# Patient Record
Sex: FEMALE | Race: WHITE | NOT HISPANIC OR LATINO | URBAN - METROPOLITAN AREA
[De-identification: names, ages, dates, MRNs, and addresses within clinical notes are randomized per-mention and may not be internally consistent; named-entity substitution may affect disease eponyms.]

---

## 2018-02-20 ENCOUNTER — INPATIENT (INPATIENT)
Facility: HOSPITAL | Age: 23
LOS: 2 days | Discharge: HOME | End: 2018-02-23
Attending: PSYCHIATRY & NEUROLOGY

## 2018-02-20 VITALS
HEART RATE: 105 BPM | SYSTOLIC BLOOD PRESSURE: 144 MMHG | RESPIRATION RATE: 20 BRPM | DIASTOLIC BLOOD PRESSURE: 100 MMHG | TEMPERATURE: 98 F | OXYGEN SATURATION: 100 %

## 2018-02-20 DIAGNOSIS — F32.9 MAJOR DEPRESSIVE DISORDER, SINGLE EPISODE, UNSPECIFIED: ICD-10-CM

## 2018-02-20 DIAGNOSIS — F60.3 BORDERLINE PERSONALITY DISORDER: ICD-10-CM

## 2018-02-20 LAB
ALBUMIN SERPL ELPH-MCNC: 4.4 G/DL — SIGNIFICANT CHANGE UP (ref 3–5.5)
ALP SERPL-CCNC: 56 U/L — SIGNIFICANT CHANGE UP (ref 30–115)
ALT FLD-CCNC: 13 U/L — SIGNIFICANT CHANGE UP (ref 0–41)
ANION GAP SERPL CALC-SCNC: 7 MMOL/L — SIGNIFICANT CHANGE UP (ref 7–14)
APAP SERPL-MCNC: <10 UG/ML — SIGNIFICANT CHANGE UP (ref 10–30)
APPEARANCE UR: (no result)
AST SERPL-CCNC: 23 U/L — SIGNIFICANT CHANGE UP (ref 0–41)
BACTERIA # UR AUTO: (no result) /HPF
BASOPHILS # BLD AUTO: 0.07 K/UL — SIGNIFICANT CHANGE UP (ref 0–0.2)
BASOPHILS NFR BLD AUTO: 0.7 % — SIGNIFICANT CHANGE UP (ref 0–1)
BILIRUB SERPL-MCNC: 0.5 MG/DL — SIGNIFICANT CHANGE UP (ref 0.2–1.2)
BILIRUB UR-MCNC: NEGATIVE — SIGNIFICANT CHANGE UP
BUN SERPL-MCNC: 8 MG/DL — LOW (ref 10–20)
CALCIUM SERPL-MCNC: 9.7 MG/DL — SIGNIFICANT CHANGE UP (ref 8.5–10.1)
CHLORIDE SERPL-SCNC: 107 MMOL/L — SIGNIFICANT CHANGE UP (ref 98–110)
CO2 SERPL-SCNC: 24 MMOL/L — SIGNIFICANT CHANGE UP (ref 17–32)
COLOR SPEC: YELLOW — SIGNIFICANT CHANGE UP
CREAT SERPL-MCNC: 0.8 MG/DL — SIGNIFICANT CHANGE UP (ref 0.7–1.5)
DIFF PNL FLD: NEGATIVE — SIGNIFICANT CHANGE UP
EOSINOPHIL # BLD AUTO: 0.58 K/UL — SIGNIFICANT CHANGE UP (ref 0–0.7)
EOSINOPHIL NFR BLD AUTO: 5.6 % — SIGNIFICANT CHANGE UP (ref 0–8)
EPI CELLS # UR: (no result) /HPF
ETHANOL SERPL-MCNC: <5 MG/DL — SIGNIFICANT CHANGE UP
GLUCOSE SERPL-MCNC: 97 MG/DL — SIGNIFICANT CHANGE UP (ref 70–110)
GLUCOSE UR QL: NEGATIVE MG/DL — SIGNIFICANT CHANGE UP
HCT VFR BLD CALC: 40.5 % — SIGNIFICANT CHANGE UP (ref 37–47)
HGB BLD-MCNC: 13.8 G/DL — LOW (ref 14–18)
IMM GRANULOCYTES NFR BLD AUTO: 0.4 % — HIGH (ref 0.1–0.3)
KETONES UR-MCNC: NEGATIVE — SIGNIFICANT CHANGE UP
LEUKOCYTE ESTERASE UR-ACNC: (no result)
LYMPHOCYTES # BLD AUTO: 3.54 K/UL — HIGH (ref 1.2–3.4)
LYMPHOCYTES # BLD AUTO: 34.1 % — SIGNIFICANT CHANGE UP (ref 20.5–51.1)
MCHC RBC-ENTMCNC: 29.7 PG — SIGNIFICANT CHANGE UP (ref 27–31)
MCHC RBC-ENTMCNC: 34.1 G/DL — SIGNIFICANT CHANGE UP (ref 32–37)
MCV RBC AUTO: 87.3 FL — SIGNIFICANT CHANGE UP (ref 81–91)
MONOCYTES # BLD AUTO: 0.69 K/UL — HIGH (ref 0.1–0.6)
MONOCYTES NFR BLD AUTO: 6.6 % — SIGNIFICANT CHANGE UP (ref 1.7–9.3)
NEUTROPHILS # BLD AUTO: 5.46 K/UL — SIGNIFICANT CHANGE UP (ref 1.4–6.5)
NEUTROPHILS NFR BLD AUTO: 52.6 % — SIGNIFICANT CHANGE UP (ref 42.2–75.2)
NITRITE UR-MCNC: NEGATIVE — SIGNIFICANT CHANGE UP
NRBC # BLD: 0 /100 WBCS — SIGNIFICANT CHANGE UP (ref 0–0)
PH UR: 5.5 — SIGNIFICANT CHANGE UP (ref 5–8)
PLATELET # BLD AUTO: 307 K/UL — SIGNIFICANT CHANGE UP (ref 130–400)
POTASSIUM SERPL-MCNC: 4.5 MMOL/L — SIGNIFICANT CHANGE UP (ref 3.5–5)
POTASSIUM SERPL-SCNC: 4.5 MMOL/L — SIGNIFICANT CHANGE UP (ref 3.5–5)
PROT SERPL-MCNC: 7.3 G/DL — SIGNIFICANT CHANGE UP (ref 6–8)
PROT UR-MCNC: (no result) MG/DL
RBC # BLD: 4.64 M/UL — SIGNIFICANT CHANGE UP (ref 4.2–5.4)
RBC # FLD: 12.4 % — SIGNIFICANT CHANGE UP (ref 11.5–14.5)
SALICYLATES SERPL-MCNC: <4 MG/DL — SIGNIFICANT CHANGE UP (ref 4–30)
SODIUM SERPL-SCNC: 138 MMOL/L — SIGNIFICANT CHANGE UP (ref 135–146)
SP GR SPEC: 1.02 — SIGNIFICANT CHANGE UP (ref 1.01–1.03)
UROBILINOGEN FLD QL: 0.2 MG/DL — SIGNIFICANT CHANGE UP (ref 0.2–0.2)
WBC # BLD: 10.38 K/UL — SIGNIFICANT CHANGE UP (ref 4.8–10.8)
WBC # FLD AUTO: 10.38 K/UL — SIGNIFICANT CHANGE UP (ref 4.8–10.8)
WBC UR QL: SIGNIFICANT CHANGE UP /HPF

## 2018-02-20 RX ORDER — HYDROXYZINE HCL 10 MG
25 TABLET ORAL ONCE
Qty: 0 | Refills: 0 | Status: COMPLETED | OUTPATIENT
Start: 2018-02-21 | End: 2018-02-21

## 2018-02-20 RX ORDER — ACETAMINOPHEN 500 MG
650 TABLET ORAL EVERY 6 HOURS
Qty: 0 | Refills: 0 | Status: DISCONTINUED | OUTPATIENT
Start: 2018-02-21 | End: 2018-02-23

## 2018-02-20 RX ORDER — DROSPIRENONE AND ETHINYL ESTRADIOL 0.03MG-3MG
0 KIT ORAL
Qty: 0 | Refills: 0 | COMMUNITY

## 2018-02-20 NOTE — ED PROVIDER NOTE - PHYSICAL EXAMINATION
VITAL SIGNS: I have reviewed nursing notes and confirm.  CONSTITUTIONAL: Well-developed; well-nourished; in no acute distress.  SKIN: Skin exam is warm and dry, no acute rash.  HEAD: Normocephalic; atraumatic.  EYES: PERRL, EOM intact; conjunctiva and sclera clear.  CARD: Regular rate and rhythm.  RESP: No wheezes, rales or rhonchi.  ABD: Normal bowel sounds; soft; non-distended; non-tender  EXT: Normal ROM.  NEURO: Alert, oriented. Grossly unremarkable. No focal deficits.  PSYCH: Cooperative, appropriate, + SI, no HI, no apparent psychosis

## 2018-02-20 NOTE — ED BEHAVIORAL HEALTH ASSESSMENT NOTE - DESCRIPTION
none known From [place]. Senior at [employer], majoring in marketing and finance. Engaged to "[employer] for past 6 months Calm and cooperative, in behavioral control

## 2018-02-20 NOTE — ED BEHAVIORAL HEALTH ASSESSMENT NOTE - RISK ASSESSMENT
Pt's increasingly frequent suicidal gestures, inability to contract for safety, and poor coping skills corroborated by her fiance place her at high risk of harm to herself at this time. Pt will be admitted to inpatient psych admission for safety and observation.

## 2018-02-20 NOTE — ED ADULT NURSE NOTE - CHIEF COMPLAINT QUOTE
Patient recently diagnosed with borderline personality disorder, presents with suicidal thoughts. Patient had plan to jump off her fiances roof last week after having a disagreement with him

## 2018-02-20 NOTE — ED PROVIDER NOTE - NS ED ROS FT
Constitutional: no fever, chills  Cardiac: No chest pain, SOB or edema.  Respiratory: No cough or respiratory distress  GI: No nausea, vomiting, diarrhea or abdominal pain.  Neuro: No headache or weakness. No LOC.  Skin: No skin rash.  Psych: see HPI  Except as documented in the HPI, all other systems are negative.

## 2018-02-20 NOTE — ED BEHAVIORAL HEALTH ASSESSMENT NOTE - CURRENT MEDICATION
No prescribed meds. Pt's fiance started giving her his Effexor in December. Pt has been taking it daily but reports her mood "got worse"

## 2018-02-20 NOTE — ED ADULT NURSE NOTE - OBJECTIVE STATEMENT
Pt complains of suicidal thoughts, states she was on her fiances roof and was tempted to jump and hasn't been able to get up to go to school

## 2018-02-20 NOTE — ED PROVIDER NOTE - MEDICAL DECISION MAKING DETAILS
Patient with history of borderline personality disorder, here for assessment of increasingly frequent thoughts of self harm VS notable for , exam is unremarkable. Will check labs, consult psych, 1:1 obs in place.

## 2018-02-20 NOTE — ED BEHAVIORAL HEALTH ASSESSMENT NOTE - SUICIDE PROTECTIVE FACTORS
Future oriented/Supportive social network or family/Identifies reasons for living/Fear of death or dying due to pain/suffering

## 2018-02-20 NOTE — ED BEHAVIORAL HEALTH ASSESSMENT NOTE - PATIENT'S CHIEF COMPLAINT
"I recently found out I have Borderline Personality Disorder and I've been having suicidal thoughts"

## 2018-02-20 NOTE — ED PROVIDER NOTE - OBJECTIVE STATEMENT
23 yo F, hx of borderline personality disorder, on effexor and recently seeing a psychologist, here for assessment of increasingly frequent thoughts of suicide. Patient states she went on her fiance's roof last week and almost jumped off. Reports that since that time whenever she is upset she thinks of harming or killing herself. No recent drug or alcohol use.

## 2018-02-20 NOTE — ED BEHAVIORAL HEALTH ASSESSMENT NOTE - PRIMARY DX
Major depressive disorder with single episode, remission status unspecified Borderline personality disorder

## 2018-02-20 NOTE — ED BEHAVIORAL HEALTH ASSESSMENT NOTE - SUMMARY
23 yo  female with no prior IPP admissions, recent dx of Borderline Personality Disorder, presents with worsening mood and increasing suicidal ideation since December. Pt's worsening mood, increased sleep, low energy, poor concentration, with impairment in functioning at school may be symptoms of a depressive disorder. Pt may benefit from an antidepressant. Pt's increasingly frequent suicidal gestures, inability to contract for safety, and poor coping skills corroborated by her fiance place her at high risk of harm to herself at this time. Pt is requesting inpatient psych admission for safety and observation. Pt's history of labile interpersonal relationships, impulsivity, feeling of emptiness in relationships, recurrent suicidal gestures and affective instability are characteristic of Borderline Personality Disorder. Pt would benefit from DBT after stabilization.

## 2018-02-20 NOTE — ED BEHAVIORAL HEALTH ASSESSMENT NOTE - HPI (INCLUDE ILLNESS QUALITY, SEVERITY, DURATION, TIMING, CONTEXT, MODIFYING FACTORS, ASSOCIATED SIGNS AND SYMPTOMS)
21 yo  female, domiciled with meme, enrolled at [employer], with no prior IPP admissions, recent dx of Borderline Personality Disorder, presented to ED for worsening mood and increasing suicidal ideation. Pt reports worsening mood since December, with increased sleep (10-11 hours a day), low energy and poor concentration. Pt has not been attending class since December "I can't get out of bed" and has been having more frequent arguments with her fiance. Pt reports frequent suicidal thoughts over the past month culminating in an interrupted attempt last week. The patient had an argument with her fiance and became upset "I started thinking he was going to leave me" and went to the roof of their 14 floor building with the intent of jumping off the ledge. Pt's fiance followed her and verbally redirected her. Pt intentionally cut her hand on the roof but did not jump off. Pt started seeing a therapist last week after this incident and was diagnosed with Borderline Personality Disorder. However, pt continued to feel depressed, with suicidal thoughts, and this week was looking for knives to cut herself with and for her boyfriend's pills with a plan to overdose on them. Pt currently denies a suicide plan, but reports feeling depressed, helpless, unsure if she can contract for safety at home, and requesting voluntary admission for safety.    Collateral from pt's fiance: Per pt's fiance, pt has been increasingly depressed and labile since December, more argumentative, increasingly verbalizing suicidal ideation, missing school and work. Pt's fimadison is concerned about her safety at home.

## 2018-02-20 NOTE — ED BEHAVIORAL HEALTH ASSESSMENT NOTE - CASE SUMMARY
Pt is a 21yo single female with chronic hx of depression and self-harming behaviors, difficulty maintaining relationship, borderline traits.  pt reported worsening depressed mood and impaired functioning with increased intensity and frequency of SIB and suicidal thoughts and impulsive behaviors to end her life.  Pt has manifested impaired functioning.  She needs IPP to stablize and treat and reassess.

## 2018-02-20 NOTE — ED BEHAVIORAL HEALTH ASSESSMENT NOTE - DETAILS
father -  anxiety, depression; mother - ? borderline traits deferred see HPI bed available discussed with referent

## 2018-02-20 NOTE — ED BEHAVIORAL HEALTH ASSESSMENT NOTE - OTHER PAST PSYCHIATRIC HISTORY (INCLUDE DETAILS REGARDING ONSET, COURSE OF ILLNESS, INPATIENT/OUTPATIENT TREATMENT)
Pt saw a therapist a few years ago for interpersonal difficulties and SIB and was told she may have BPD. Pt started seeing a therapist Jeannette Bertrand last week who diagnosed her with BPD

## 2018-02-21 DIAGNOSIS — S06.0X0D CONCUSSION WITHOUT LOSS OF CONSCIOUSNESS, SUBSEQUENT ENCOUNTER: ICD-10-CM

## 2018-02-21 LAB
AMPHET UR-MCNC: NEGATIVE — SIGNIFICANT CHANGE UP
BARBITURATES UR SCN-MCNC: NEGATIVE — SIGNIFICANT CHANGE UP
BENZODIAZ UR-MCNC: NEGATIVE — SIGNIFICANT CHANGE UP
CHOLEST SERPL-MCNC: 188 MG/DL — SIGNIFICANT CHANGE UP (ref 100–200)
COCAINE METAB.OTHER UR-MCNC: NEGATIVE — SIGNIFICANT CHANGE UP
HCG SERPL-ACNC: <0.6 MIU/ML — SIGNIFICANT CHANGE UP (ref 0–5)
HDLC SERPL-MCNC: 70 MG/DL — HIGH (ref 40–60)
LIPID PNL WITH DIRECT LDL SERPL: 98 MG/DL — SIGNIFICANT CHANGE UP (ref 50–100)
METHADONE UR-MCNC: NEGATIVE — SIGNIFICANT CHANGE UP
OPIATES UR-MCNC: NEGATIVE — SIGNIFICANT CHANGE UP
PCP SPEC-MCNC: SIGNIFICANT CHANGE UP
PROPOXYPHENE QUALITATIVE URINE RESULT: NEGATIVE — SIGNIFICANT CHANGE UP
TOTAL CHOLESTEROL/HDL RATIO MEASUREMENT: 2.7 RATIO — LOW (ref 4–5.5)
TRIGL SERPL-MCNC: 129 MG/DL — SIGNIFICANT CHANGE UP (ref 40–150)

## 2018-02-21 RX ORDER — FLUOXETINE HCL 10 MG
20 CAPSULE ORAL DAILY
Qty: 0 | Refills: 0 | Status: DISCONTINUED | OUTPATIENT
Start: 2018-02-22 | End: 2018-02-23

## 2018-02-21 RX ORDER — FLUOXETINE HCL 10 MG
10 CAPSULE ORAL ONCE
Qty: 0 | Refills: 0 | Status: COMPLETED | OUTPATIENT
Start: 2018-02-21 | End: 2018-02-21

## 2018-02-21 RX ADMIN — Medication 10 MILLIGRAM(S): at 12:23

## 2018-02-21 RX ADMIN — Medication 25 MILLIGRAM(S): at 01:10

## 2018-02-21 NOTE — CONSULT NOTE ADULT - SUBJECTIVE AND OBJECTIVE BOX
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  Concussion last one 1-2 mo ago  ADD (attention deficit disorder)  Borderline personality disorder  No significant past surgical history      REVIEW OF SYSTEMS:  CONSTITUTIONAL: No fever, weight loss, or fatigue  EYES: No eye pain, visual disturbances, or discharge  ENMT:  No difficulty hearing, tinnitus, vertigo; No sinus or throat pain  NECK: No pain or stiffness  BREASTS: No pain, masses, or nipple discharge  RESPIRATORY: No cough, wheezing, chills or hemoptysis; No shortness of breath  CARDIOVASCULAR: No chest pain, palpitations, dizziness, or leg swelling  GASTROINTESTINAL: No abdominal or epigastric pain. No nausea, vomiting, or hematemesis; No diarrhea or constipation. No melena or hematochezia.  GENITOURINARY: No dysuria, frequency, hematuria, or incontinence  NEUROLOGICAL: No headaches, memory loss, loss of strength, numbness, or tremors  SKIN: No itching, burning, rashes, or lesions   LYMPH NODES: No enlarged glands  ALLERY AND IMMUNOLOGIC: No hives or eczema    Home Medications:  Effexor XR 75 mg oral capsule, extended release: 1 cap(s) orally once a day (20 Feb 2018 19:41)  Maribel: orally once a day (20 Feb 2018 19:41)  T(C): 36.8 (02-21-18 @ 11:52), Max: 36.8 (02-20-18 @ 18:39)  HR: 99 (02-21-18 @ 11:52) (96 - 112)  BP: 131/83 (02-21-18 @ 11:52) (123/77 - 144/100)  RR: 16 (02-21-18 @ 11:52) (16 - 20)  SpO2: 100% (02-20-18 @ 21:16) (100% - 100%)  Wt(kg): --Vital Signs Last 24 Hrs  T(C): 36.8 (21 Feb 2018 11:52), Max: 36.8 (20 Feb 2018 18:39)  T(F): 98.3 (21 Feb 2018 11:52), Max: 98.3 (21 Feb 2018 11:52)  HR: 99 (21 Feb 2018 11:52) (96 - 112)  BP: 131/83 (21 Feb 2018 11:52) (123/77 - 144/100)  BP(mean): --  RR: 16 (21 Feb 2018 11:52) (16 - 20)  SpO2: 100% (20 Feb 2018 21:16) (100% - 100%)    PHYSICAL EXAM:  GENERAL: NAD, well-groomed, well-developed  HEAD:  Atraumatic, Normocephalic  EYES: EOMI, PERRLA, conjunctiva and sclera clear  ENMT: No tonsillar erythema, exudates, or enlargement; Moist mucous membranes, Good dentition, No lesions  NECK: Supple, No JVD, Normal thyroid  NERVOUS SYSTEM:  Alert & Oriented X3, Good concentration; Motor Strength 5/5 B/L upper and lower extremities; DTRs 2+ intact and symmetric  CHEST/LUNG: Clear to percussion bilaterally; No rales, rhonchi, wheezing, or rubs  HEART: Regular rate and rhythm; No murmurs, rubs, or gallops  ABDOMEN: Soft, Nontender, Nondistended; Bowel sounds present  EXTREMITIES:  2+ Peripheral Pulses, No clubbing, cyanosis, or edema  LYMPH: No lymphadenopathy noted  SKIN: No rashes or lesions    Consultant(s) Notes Reviewed:  [x ] YES  [ ] NO  Care Discussed with Consultants/Other Providers [ x] YES  [ ] NO    LABS:  CBC   02-20-18 @ 19:36  Hematcorit 40.5  Hemoglobin 13.8  Mean Cell Hemoglobin 29.7  Platelet Count-Automated 307  RBC Count 4.64  Red Cell Distrib Width 12.4  Wbc Count 10.38      BMP  02-20-18 @ 19:36  Anion Gap. Serum 7  Blood Urea Nitrogen,Serm 8  Calcium, Total Serum 9.7  Carbon Dioxide, Serum 24  Chloride, Serum 107  Creatinine, Serum 0.8  eGFR in  117  eGFR in Non Afican American 101  Gloucose, serum 97  Potassium, Serum 4.5  Sodium, Serum 138                  CMP  02-20-18 @ 19:36  Ashli Aminotransferase(ALT/SGPT)13  Albumin, Serum 4.4  Alkaline Phosphatase, Serum 56  Anion Gap, Serum 7  Aspartate Aminotransferase (AST/SGOT)23  Bilirubin Total, Serum 0.5  Blood Urea Nitrogen, Serum 8  Calcium,Total Serum 9.7  Carbon Dioxide, Serum 24  Chloride, Serum 107  Creatinine, Serum 0.8  eGFR if  117  eGFR if Non African American 101  Glucose, Serum 97  Potassium, Serum 4.5  Protein Total, Serum 7.3  Sodium, Serum 138                          PT/INR      Amylase/Lipase          RADIOLOGY & ADDITIONAL TESTS:    Imaging Personally Reviewed:  [ ] YES  [ ] NO

## 2018-02-21 NOTE — PATIENT PROFILE BEHAVIORAL HEALTH - PMH
ADD (attention deficit disorder)    Anaplasmosis  from tick bite  Borderline personality disorder    Concussion

## 2018-02-21 NOTE — PATIENT PROFILE BEHAVIORAL HEALTH - NSBHIDEAT_PSY_A_CORE
Pt states she does not have urge to harm herself at this time - Constant observation in progress/absent

## 2018-02-21 NOTE — H&P ADULT - HISTORY OF PRESENT ILLNESS
"I recently found out I have Borderline Personality Disorder and I've been having suicidal thoughts"	  23 yo  female, domiciled with meme, enrolled at [employer], with no prior IPP admissions, recent dx of Borderline Personality Disorder, presented to ED for worsening mood and increasing suicidal ideation. Pt reports worsening mood since December, with increased sleep (10-11 hours a day), low energy and poor concentration. Pt has not been attending class since December "I can't get out of bed" and has been having more frequent arguments with her fiance. Pt reports frequent suicidal thoughts over the past month culminating in an interrupted attempt last week. The patient had an argument with her fiance and became upset "I started thinking he was going to leave me" and went to the roof of their 14 floor building with the intent of jumping off the ledge. Pt's fiance followed her and verbally redirected her. Pt intentionally cut her hand on the roof but did not jump off. Pt started seeing a therapist last week after this incident and was diagnosed with Borderline Personality Disorder. However, pt continued to feel depressed, with suicidal thoughts, and this week was looking for knives to cut herself with and for her boyfriend's pills with a plan to overdose on them. Pt currently denies a suicide plan, but reports feeling depressed, helpless, unsure if she can contract for safety at home, and requesting voluntary admission for safety.  Collateral from pt's fiance: Per pt's fiance, pt has been increasingly depressed and labile since December, more argumentative, increasingly verbalizing suicidal ideation, missing school and work. Pt's fiance is concerned about her safety at home.
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
## 2018-02-21 NOTE — BEHAVIORAL HEALTH ASSESSMENT NOTE - HPI (INCLUDE ILLNESS QUALITY, SEVERITY, DURATION, TIMING, CONTEXT, MODIFYING FACTORS, ASSOCIATED SIGNS AND SYMPTOMS)
This is a 23yo  female, full time student at [employer], domiciled with meme, with recent diagnosis of borderline personality disorder from her therapist of 1 week, who presented to the ED to get the right medications for her diagnosis in the context of thinking about jumping off of her apartment building last week after having an argument with her fiance. On approach, pt was calm and cooperative. She states that for the past few weeks, she has been missing class, sleeping more than 11 hours a night, has no motivation to get out of bed, does not feel like hanging out with her friends, has trouble concentrating, and has low energy. She states that she has been having more arguments with her fimadison and when she feels hurt, she has been having intense thoughts of ending her life and she states she feels terrified that she is thinking this way. She states that she went to the 14th floor of her building and threatened her fiance that she was going to jump and he followed her up. She states that she felt very terrified and could not go through with it and denies any suicidal thoughts at the moment. She denies any past suicide attempts. She states that she cut herself with a razor in high school for some time and also started cutting last year for a few weeks. She denies any cutting behavior recently but does admit that after thinking of jumping off the building, she scraped her arm against the wall on purpose. She states that for the past few weeks, she has been taking her fiance's effexor 75mg daily because he told her it would make her feel better. She denies any symptoms of anxiety, psychosis, or eri. She denies any current substance use or past suicide attempts.

## 2018-02-21 NOTE — BEHAVIORAL HEALTH ASSESSMENT NOTE - NSBHSUICPROTECTFACT_PSY_A_CORE
Identifies reasons for living/Engaged in work or school/Supportive social network or family/Fear of death or dying due to pain/suffering/Positive therapeutic relationships/Responsibility to family and others/Future oriented

## 2018-02-21 NOTE — BEHAVIORAL HEALTH ASSESSMENT NOTE - SUMMARY
Pt is a 23yo single female with recent diagnosis of borderline personality disorder and self-harming behaviors, was admitted to [facility] for worsening depression, passive suicidal ideation, and impulsive beheaviors. At this time, patient does meet the criteria of major depressive disorder and borderline personality disorder and would benefit from SSRI as well as DBT.

## 2018-02-21 NOTE — BEHAVIORAL HEALTH ASSESSMENT NOTE - NSBHADMITIPSTRENGTH_PSY_A_CORE
In good physical health/Attempting to realize his/her potential/Has vocational interests or hobbies/Intelligent/Has supportive interpersonal relationships with family, friends or peers/Knowledge of medications/Motivated and ready for change

## 2018-02-22 LAB — TSH SERPL-MCNC: 1.54 UIU/ML — SIGNIFICANT CHANGE UP (ref 0.27–4.2)

## 2018-02-22 RX ADMIN — Medication 20 MILLIGRAM(S): at 08:02

## 2018-02-22 NOTE — PROGRESS NOTE BEHAVIORAL HEALTH - NSBHFUPINTERVALHXFT_PSY_A_CORE
Pt seen and examined. Case discussed with attending and staff. Per nursing, no overnight events. Patient states she slept well and reports no side effects to fluoxetine. She states she feels better after talking to Student Doctor Marilou yesterday for a little more than an hour about different coping mechanisms and stresses. She denies any other complaints at this time.     Per patient's obinna Guardado- pt would benefit from staying in IP longer if needed-- he states that she hasn't been herself since her concussion in December and her anxiety and depression got worse. He states that she seems a little better but he's not entirely sure. He states that she does better when he takes care of her as she is "very attached".

## 2018-02-22 NOTE — PROGRESS NOTE BEHAVIORAL HEALTH - RISK ASSESSMENT
Pt's increasingly frequent suicidal gestures, inability to contract for safety, and poor coping skills corroborated by her fiance place her at high risk of harm to herself at this time. Pt needs inpatient psych hospitalization for safety and observation.

## 2018-02-22 NOTE — PROGRESS NOTE BEHAVIORAL HEALTH - MODIFICATIONS
pt with improved insight into behaviors and appropriate treatment.  No suicidal ideation ever reported on admission. good experience with DBT and motivated for f/u

## 2018-02-23 VITALS
RESPIRATION RATE: 16 BRPM | HEART RATE: 92 BPM | DIASTOLIC BLOOD PRESSURE: 90 MMHG | SYSTOLIC BLOOD PRESSURE: 138 MMHG | TEMPERATURE: 98 F

## 2018-02-23 RX ORDER — FLUOXETINE HCL 10 MG
1 CAPSULE ORAL
Qty: 30 | Refills: 0 | OUTPATIENT
Start: 2018-02-23

## 2018-02-23 RX ORDER — FLUOXETINE HCL 10 MG
1 CAPSULE ORAL
Qty: 0 | Refills: 0 | COMMUNITY
Start: 2018-02-23

## 2018-02-23 RX ORDER — VENLAFAXINE HCL 75 MG
1 CAPSULE, EXT RELEASE 24 HR ORAL
Qty: 0 | Refills: 0 | COMMUNITY

## 2018-02-23 RX ADMIN — Medication 20 MILLIGRAM(S): at 08:38

## 2018-02-23 NOTE — DISCHARGE NOTE BEHAVIORAL HEALTH - MEDICATION SUMMARY - MEDICATIONS TO STOP TAKING
I will STOP taking the medications listed below when I get home from the hospital:    Effexor XR 75 mg oral capsule, extended release  -- 1 cap(s) by mouth once a day

## 2018-02-23 NOTE — DISCHARGE NOTE BEHAVIORAL HEALTH - HPI (INCLUDE ILLNESS QUALITY, SEVERITY, DURATION, TIMING, CONTEXT, MODIFYING FACTORS, ASSOCIATED SIGNS AND SYMPTOMS)
Pt is a 21yo single female with recent diagnosis of borderline personality disorder and self-harming behaviors, was admitted to IPP for worsening depression, passive suicidal ideation, and impulsive behaviors. Patient denies any suicidality, has no prior suicide attempts, is future oriented, has access and willingness to receive care, and has been compliant with medications.

## 2018-02-23 NOTE — PROGRESS NOTE ADULT - SUBJECTIVE AND OBJECTIVE BOX
no psychosis or s/h ideation.  improved mood and is very motivated for out pt treatment/DBT    d/c today.    dx-depression; cluster B personality disorder

## 2018-02-23 NOTE — DISCHARGE NOTE BEHAVIORAL HEALTH - MEDICATION SUMMARY - MEDICATIONS TO TAKE
I will START or STAY ON the medications listed below when I get home from the hospital:    FLUoxetine 20 mg oral capsule  -- 1 cap(s) by mouth once a day  -- Indication: For Major depressive disorder with single episode, remission status unspecified    Maribel  -- orally once a day  -- Indication: For Birth control

## 2018-02-27 DIAGNOSIS — F32.9 MAJOR DEPRESSIVE DISORDER, SINGLE EPISODE, UNSPECIFIED: ICD-10-CM

## 2018-02-27 DIAGNOSIS — Z59.0 HOMELESSNESS: ICD-10-CM

## 2018-02-27 DIAGNOSIS — R45.851 SUICIDAL IDEATIONS: ICD-10-CM

## 2018-02-27 DIAGNOSIS — F60.3 BORDERLINE PERSONALITY DISORDER: ICD-10-CM

## 2018-02-27 SDOH — ECONOMIC STABILITY - HOUSING INSECURITY: HOMELESSNESS: Z59.0

## 2018-08-08 ENCOUNTER — LAB REQUISITION (OUTPATIENT)
Dept: ADMINISTRATIVE | Age: 23
End: 2018-08-08
Payer: COMMERCIAL

## 2018-08-08 DIAGNOSIS — F39 MOOD DISORDER (HCC): ICD-10-CM

## 2018-08-08 LAB
B-HCG UR QL: NEGATIVE
BILIRUB UR QL: NEGATIVE
COLOR UR: YELLOW
GLUCOSE UR-MCNC: NEGATIVE MG/DL
KETONES UR-MCNC: NEGATIVE MG/DL
NITRITE UR QL STRIP.AUTO: POSITIVE
PH UR: 5 [PH] (ref 5–8)
PROT UR-MCNC: NEGATIVE MG/DL
RBC #/AREA URNS AUTO: 0 /HPF
SP GR UR STRIP: 1.02 (ref 1–1.03)
UROBILINOGEN UR STRIP-ACNC: <2
VIT C UR-MCNC: NEGATIVE MG/DL
WBC #/AREA URNS AUTO: 47 /HPF

## 2018-08-08 PROCEDURE — 81001 URINALYSIS AUTO W/SCOPE: CPT | Performed by: OTHER

## 2018-08-08 PROCEDURE — 81025 URINE PREGNANCY TEST: CPT | Performed by: OTHER

## 2018-08-09 LAB
ALBUMIN SERPL BCP-MCNC: 4.3 G/DL (ref 3.5–4.8)
ALBUMIN/GLOB SERPL: 1.6 {RATIO} (ref 1–2)
ALP SERPL-CCNC: 51 U/L (ref 32–100)
ALT SERPL-CCNC: 12 U/L (ref 14–54)
ANION GAP SERPL CALC-SCNC: 8 MMOL/L (ref 0–18)
AST SERPL-CCNC: 22 U/L (ref 15–41)
BASOPHILS # BLD: 0.1 K/UL (ref 0–0.2)
BASOPHILS NFR BLD: 1 %
BILIRUB SERPL-MCNC: 0.6 MG/DL (ref 0.3–1.2)
BUN SERPL-MCNC: 6 MG/DL (ref 8–20)
BUN/CREAT SERPL: 7.5 (ref 10–20)
CALCIUM SERPL-MCNC: 9.3 MG/DL (ref 8.5–10.5)
CHLORIDE SERPL-SCNC: 110 MMOL/L (ref 95–110)
CHOLEST SERPL-MCNC: 215 MG/DL (ref 110–200)
CO2 SERPL-SCNC: 22 MMOL/L (ref 22–32)
CREAT SERPL-MCNC: 0.8 MG/DL (ref 0.5–1.5)
EOSINOPHIL # BLD: 0.2 K/UL (ref 0–0.7)
EOSINOPHIL NFR BLD: 2 %
ERYTHROCYTE [DISTWIDTH] IN BLOOD BY AUTOMATED COUNT: 14.3 % (ref 11–15)
GLOBULIN PLAS-MCNC: 2.7 G/DL (ref 2.5–3.7)
GLUCOSE SERPL-MCNC: 79 MG/DL (ref 70–99)
HCT VFR BLD AUTO: 39.6 % (ref 35–48)
HDLC SERPL-MCNC: 59 MG/DL
HGB BLD-MCNC: 13.3 G/DL (ref 12–16)
LDLC SERPL CALC-MCNC: 144 MG/DL (ref 0–99)
LYMPHOCYTES # BLD: 2.6 K/UL (ref 1–4)
LYMPHOCYTES NFR BLD: 32 %
MCH RBC QN AUTO: 30 PG (ref 27–32)
MCHC RBC AUTO-ENTMCNC: 33.6 G/DL (ref 32–37)
MCV RBC AUTO: 89 FL (ref 80–100)
MONOCYTES # BLD: 0.6 K/UL (ref 0–1)
MONOCYTES NFR BLD: 7 %
NEUTROPHILS # BLD AUTO: 4.6 K/UL (ref 1.8–7.7)
NEUTROPHILS NFR BLD: 58 %
NONHDLC SERPL-MCNC: 156 MG/DL
OSMOLALITY UR CALC.SUM OF ELEC: 287 MOSM/KG (ref 275–295)
PLATELET # BLD AUTO: 271 K/UL (ref 140–400)
PMV BLD AUTO: 8.7 FL (ref 7.4–10.3)
POTASSIUM SERPL-SCNC: 3.9 MMOL/L (ref 3.3–5.1)
PREALB SERPL-MCNC: 27 MG/DL (ref 17.1–100)
PROT SERPL-MCNC: 7 G/DL (ref 5.9–8.4)
RBC # BLD AUTO: 4.45 M/UL (ref 3.7–5.4)
SODIUM SERPL-SCNC: 140 MMOL/L (ref 136–144)
TRIGL SERPL-MCNC: 58 MG/DL (ref 1–149)
TSH SERPL-ACNC: 0.61 UIU/ML (ref 0.45–5.33)
VIT B12 SERPL-MCNC: 418 PG/ML (ref 181–914)
WBC # BLD AUTO: 8 K/UL (ref 4–11)

## 2018-08-09 PROCEDURE — 36415 COLL VENOUS BLD VENIPUNCTURE: CPT | Performed by: OTHER

## 2018-08-09 PROCEDURE — 84134 ASSAY OF PREALBUMIN: CPT | Performed by: HOSPITALIST

## 2018-08-09 PROCEDURE — 82607 VITAMIN B-12: CPT | Performed by: HOSPITALIST

## 2018-08-09 PROCEDURE — 85025 COMPLETE CBC W/AUTO DIFF WBC: CPT | Performed by: OTHER

## 2018-08-09 PROCEDURE — 80053 COMPREHEN METABOLIC PANEL: CPT | Performed by: OTHER

## 2018-08-09 PROCEDURE — 80061 LIPID PANEL: CPT | Performed by: OTHER

## 2018-08-09 PROCEDURE — 82306 VITAMIN D 25 HYDROXY: CPT | Performed by: HOSPITALIST

## 2018-08-09 PROCEDURE — 84443 ASSAY THYROID STIM HORMONE: CPT | Performed by: OTHER

## 2018-08-10 ENCOUNTER — LAB REQUISITION (OUTPATIENT)
Dept: ADMINISTRATIVE | Age: 23
End: 2018-08-10
Payer: COMMERCIAL

## 2018-08-10 DIAGNOSIS — N39.0 UTI (URINARY TRACT INFECTION): ICD-10-CM

## 2018-08-10 LAB
25(OH)D3 SERPL-MCNC: 26.6 NG/ML
BACTERIA UR QL AUTO: NEGATIVE /HPF
RBC #/AREA URNS AUTO: 1 /HPF
WBC #/AREA URNS AUTO: 2 /HPF

## 2018-08-10 PROCEDURE — 81015 MICROSCOPIC EXAM OF URINE: CPT | Performed by: HOSPITALIST

## 2018-08-19 ENCOUNTER — HOSPITAL (OUTPATIENT)
Dept: OTHER | Age: 23
End: 2018-08-19
Attending: EMERGENCY MEDICINE

## 2018-08-19 LAB
ANION GAP SERPL CALC-SCNC: 13 MMOL/L (ref 10–20)
BUN SERPL-MCNC: 8 MG/DL (ref 6–20)
BUN/CREAT SERPL: 11 (ref 7–25)
CHLORIDE: 105 MMOL/L (ref 98–107)
CO2 SERPL-SCNC: 26 MMOL/L (ref 21–32)
CREAT SERPL-MCNC: 0.7 MG/DL (ref 0.51–0.95)
GLUCOSE SERPL-MCNC: 80 MG/DL (ref 65–99)
HCG POINT OF CARE (5HGRST): NEGATIVE
POTASSIUM SERPL-SCNC: 4.1 MMOL/L (ref 3.4–5.1)
SODIUM SERPL-SCNC: 140 MMOL/L (ref 135–145)

## 2018-08-21 ENCOUNTER — LAB REQUISITION (OUTPATIENT)
Dept: ADMINISTRATIVE | Age: 23
End: 2018-08-21
Payer: COMMERCIAL

## 2018-08-21 DIAGNOSIS — F33.2 SEVERE RECURRENT MAJOR DEPRESSION WITHOUT PSYCHOTIC FEATURES (HCC): ICD-10-CM

## 2018-08-21 LAB
AMYLASE SERPL-CCNC: 90 U/L (ref 24–108)
ANION GAP SERPL CALC-SCNC: 8 MMOL/L (ref 0–18)
BUN SERPL-MCNC: 11 MG/DL (ref 8–20)
BUN/CREAT SERPL: 14.3 (ref 10–20)
CALCIUM SERPL-MCNC: 9.9 MG/DL (ref 8.5–10.5)
CHLORIDE SERPL-SCNC: 105 MMOL/L (ref 95–110)
CO2 SERPL-SCNC: 25 MMOL/L (ref 22–32)
CREAT SERPL-MCNC: 0.77 MG/DL (ref 0.5–1.5)
GLUCOSE SERPL-MCNC: 82 MG/DL (ref 70–99)
MAGNESIUM SERPL-MCNC: 2.1 MG/DL (ref 1.8–2.5)
OSMOLALITY UR CALC.SUM OF ELEC: 284 MOSM/KG (ref 275–295)
PHOSPHATE SERPL-MCNC: 3.3 MG/DL (ref 2.4–4.7)
POTASSIUM SERPL-SCNC: 3.9 MMOL/L (ref 3.3–5.1)
SODIUM SERPL-SCNC: 138 MMOL/L (ref 136–144)

## 2018-08-21 PROCEDURE — 82150 ASSAY OF AMYLASE: CPT | Performed by: HOSPITALIST

## 2018-08-21 PROCEDURE — 80048 BASIC METABOLIC PNL TOTAL CA: CPT | Performed by: HOSPITALIST

## 2018-08-21 PROCEDURE — 36415 COLL VENOUS BLD VENIPUNCTURE: CPT | Performed by: HOSPITALIST

## 2018-08-21 PROCEDURE — 84100 ASSAY OF PHOSPHORUS: CPT | Performed by: HOSPITALIST

## 2018-08-21 PROCEDURE — 83735 ASSAY OF MAGNESIUM: CPT | Performed by: HOSPITALIST

## 2018-08-28 ENCOUNTER — LAB REQUISITION (OUTPATIENT)
Dept: ADMINISTRATIVE | Age: 23
End: 2018-08-28
Payer: COMMERCIAL

## 2018-08-28 DIAGNOSIS — F33.2 SEVERE RECURRENT MAJOR DEPRESSION WITHOUT PSYCHOTIC FEATURES (HCC): ICD-10-CM

## 2018-08-28 LAB
AMYLASE SERPL-CCNC: 83 U/L (ref 24–108)
ANION GAP SERPL CALC-SCNC: 7 MMOL/L (ref 0–18)
BUN SERPL-MCNC: 9 MG/DL (ref 8–20)
BUN/CREAT SERPL: 11.1 (ref 10–20)
CALCIUM SERPL-MCNC: 9.5 MG/DL (ref 8.5–10.5)
CHLORIDE SERPL-SCNC: 105 MMOL/L (ref 95–110)
CO2 SERPL-SCNC: 25 MMOL/L (ref 22–32)
CREAT SERPL-MCNC: 0.81 MG/DL (ref 0.5–1.5)
GLUCOSE SERPL-MCNC: 74 MG/DL (ref 70–99)
MAGNESIUM SERPL-MCNC: 2 MG/DL (ref 1.8–2.5)
OSMOLALITY UR CALC.SUM OF ELEC: 281 MOSM/KG (ref 275–295)
PHOSPHATE SERPL-MCNC: 3.5 MG/DL (ref 2.4–4.7)
POTASSIUM SERPL-SCNC: 3.9 MMOL/L (ref 3.3–5.1)
SODIUM SERPL-SCNC: 137 MMOL/L (ref 136–144)

## 2018-08-28 PROCEDURE — 82150 ASSAY OF AMYLASE: CPT | Performed by: HOSPITALIST

## 2018-08-28 PROCEDURE — 84100 ASSAY OF PHOSPHORUS: CPT | Performed by: HOSPITALIST

## 2018-08-28 PROCEDURE — 36415 COLL VENOUS BLD VENIPUNCTURE: CPT | Performed by: HOSPITALIST

## 2018-08-28 PROCEDURE — 80048 BASIC METABOLIC PNL TOTAL CA: CPT | Performed by: HOSPITALIST

## 2018-08-28 PROCEDURE — 83735 ASSAY OF MAGNESIUM: CPT | Performed by: HOSPITALIST

## 2018-09-04 ENCOUNTER — LAB REQUISITION (OUTPATIENT)
Dept: ADMINISTRATIVE | Age: 23
End: 2018-09-04
Payer: COMMERCIAL

## 2018-09-04 DIAGNOSIS — F33.2 SEVERE RECURRENT MAJOR DEPRESSION WITHOUT PSYCHOTIC FEATURES (HCC): ICD-10-CM

## 2018-09-04 LAB
AMYLASE SERPL-CCNC: 108 U/L (ref 24–108)
ANION GAP SERPL CALC-SCNC: 7 MMOL/L (ref 0–18)
BUN SERPL-MCNC: 8 MG/DL (ref 8–20)
BUN/CREAT SERPL: 11.6 (ref 10–20)
CALCIUM SERPL-MCNC: 10 MG/DL (ref 8.5–10.5)
CHLORIDE SERPL-SCNC: 107 MMOL/L (ref 95–110)
CO2 SERPL-SCNC: 24 MMOL/L (ref 22–32)
CREAT SERPL-MCNC: 0.69 MG/DL (ref 0.5–1.5)
GLUCOSE SERPL-MCNC: 98 MG/DL (ref 70–99)
MAGNESIUM SERPL-MCNC: 2.1 MG/DL (ref 1.8–2.5)
OSMOLALITY UR CALC.SUM OF ELEC: 284 MOSM/KG (ref 275–295)
PHOSPHATE SERPL-MCNC: 3.8 MG/DL (ref 2.4–4.7)
POTASSIUM SERPL-SCNC: 4.4 MMOL/L (ref 3.3–5.1)
SODIUM SERPL-SCNC: 138 MMOL/L (ref 136–144)

## 2018-09-04 PROCEDURE — 80048 BASIC METABOLIC PNL TOTAL CA: CPT | Performed by: HOSPITALIST

## 2018-09-04 PROCEDURE — 83735 ASSAY OF MAGNESIUM: CPT | Performed by: HOSPITALIST

## 2018-09-04 PROCEDURE — 84100 ASSAY OF PHOSPHORUS: CPT | Performed by: HOSPITALIST

## 2018-09-04 PROCEDURE — 82150 ASSAY OF AMYLASE: CPT | Performed by: HOSPITALIST

## 2019-11-18 NOTE — ED BEHAVIORAL HEALTH ASSESSMENT NOTE - EMPLOYMENT
Provider Procedure Text (D): After obtaining clear surgical margins the defect was repaired by another provider. Student

## 2021-04-09 NOTE — H&P ADULT - PROBLEM SELECTOR PROBLEM 1
Faisal Sheridan(Attending)
Major depressive disorder with single episode, remission status unspecified

## 2023-11-08 NOTE — ED BEHAVIORAL HEALTH ASSESSMENT NOTE - PERSONAL COLLATERAL RELATIONSHIP
Propranolol Counseling:  I discussed with the patient the risks of propranolol including but not limited to low heart rate, low blood pressure, low blood sugar, restlessness and increased cold sensitivity. They should call the office if they experience any of these side effects. meme